# Patient Record
Sex: FEMALE | Race: WHITE | Employment: FULL TIME | URBAN - METROPOLITAN AREA
[De-identification: names, ages, dates, MRNs, and addresses within clinical notes are randomized per-mention and may not be internally consistent; named-entity substitution may affect disease eponyms.]

---

## 2017-01-17 ENCOUNTER — TELEPHONE (OUTPATIENT)
Dept: FAMILY MEDICINE CLINIC | Age: 47
End: 2017-01-17

## 2017-01-17 NOTE — TELEPHONE ENCOUNTER
----- Message from Brittany Anne sent at 1/17/2017 10:33 AM EST -----  Regarding: Dr. Nimo oJnes  Pt called regarding status of test results requested a wk ago. Pt would like a call back today. Best contact number [phone]) until 5:00 p.m.

## 2017-01-18 NOTE — TELEPHONE ENCOUNTER
LOV: 08/11/2015  NOV: none  LAB: 12-  TSH: 4.480        Spoke with patient, ID verified X 2. Informed patient of lab results and recommendations per Dr. Valery Evans. Patient verbalized understanding.

## 2017-01-19 RX ORDER — LEVOTHYROXINE SODIUM 100 UG/1
100 TABLET ORAL
Qty: 90 TAB | Refills: 0 | Status: SHIPPED | OUTPATIENT
Start: 2017-01-19 | End: 2017-04-18 | Stop reason: SDUPTHER

## 2017-08-11 RX ORDER — ESOMEPRAZOLE MAGNESIUM 40 MG/1
CAPSULE, DELAYED RELEASE ORAL
Qty: 60 CAP | Refills: 0 | Status: SHIPPED | OUTPATIENT
Start: 2017-08-11 | End: 2017-11-26 | Stop reason: SDUPTHER

## 2017-10-16 ENCOUNTER — HOSPITAL ENCOUNTER (OUTPATIENT)
Dept: LAB | Age: 47
Discharge: HOME | End: 2017-10-16
Payer: COMMERCIAL

## 2017-10-16 ENCOUNTER — OFFICE VISIT (OUTPATIENT)
Dept: FAMILY MEDICINE CLINIC | Age: 47
End: 2017-10-16

## 2017-10-16 VITALS
BODY MASS INDEX: 31.59 KG/M2 | OXYGEN SATURATION: 100 % | SYSTOLIC BLOOD PRESSURE: 122 MMHG | DIASTOLIC BLOOD PRESSURE: 61 MMHG | RESPIRATION RATE: 16 BRPM | HEART RATE: 66 BPM | TEMPERATURE: 97.5 F | WEIGHT: 189.6 LBS | HEIGHT: 65 IN

## 2017-10-16 DIAGNOSIS — H54.8 LEGAL BLINDNESS: ICD-10-CM

## 2017-10-16 DIAGNOSIS — Z01.419 WELL WOMAN EXAM WITH ROUTINE GYNECOLOGICAL EXAM: Primary | ICD-10-CM

## 2017-10-16 DIAGNOSIS — R07.89 OTHER CHEST PAIN: ICD-10-CM

## 2017-10-16 DIAGNOSIS — R10.13 EPIGASTRIC ABDOMINAL PAIN: ICD-10-CM

## 2017-10-16 DIAGNOSIS — R12 HEARTBURN: ICD-10-CM

## 2017-10-16 DIAGNOSIS — E78.00 PURE HYPERCHOLESTEROLEMIA: ICD-10-CM

## 2017-10-16 DIAGNOSIS — E55.9 VITAMIN D DEFICIENCY: ICD-10-CM

## 2017-10-16 DIAGNOSIS — E06.3 HYPOTHYROIDISM DUE TO HASHIMOTO'S THYROIDITIS: ICD-10-CM

## 2017-10-16 DIAGNOSIS — N93.8 DUB (DYSFUNCTIONAL UTERINE BLEEDING): ICD-10-CM

## 2017-10-16 DIAGNOSIS — Z12.39 BREAST CANCER SCREENING: ICD-10-CM

## 2017-10-16 DIAGNOSIS — E03.8 HYPOTHYROIDISM DUE TO HASHIMOTO'S THYROIDITIS: ICD-10-CM

## 2017-10-16 DIAGNOSIS — R10.31 RLQ ABDOMINAL PAIN: ICD-10-CM
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
## 2017-10-16 DIAGNOSIS — R19.5 LOOSE STOOLS: ICD-10-CM

## 2017-10-16 PROCEDURE — 88175 CYTOPATH C/V AUTO FLUID REDO: CPT | Performed by: FAMILY MEDICINE

## 2017-10-16 NOTE — PROGRESS NOTES
HISTORY OF PRESENT ILLNESS  Bisi Ling is a 49 y.o. female here for an annual physical exam    Agree with nurse note. I last saw Bisi on 08/11/15 for a physical.      Legally blind pt with Hashimoto's thyroiditis, hypercholesterolemia, and Vit D deficiency presents to the office with a BP of 122/61. She mentions she has had elevated BP readings when wanting to give blood. For hypothyroidism, she takes Synthroid 100 mcg daily, tolerating well. She is fasting for labs today. She still has irregular menses and notes menses come q3 weeks. \"I have always been irregular. \" Her most recent pap smear was on 05/24/13 performed by me; normal.     Pt's most recent mammogram was on 03/05/15; normal.     She sees dermatologist, Dr. Nereida Damico annually. New Concerns    She mentions waking up periodically in the middle of the night \"burping bile. \" She also gets epigastric abdominal pain off and on. She takes Nexium every other day and takes Tums prn. She is unsure if these episodes occur on the days she skips Nexium. She gets RLQ abdominal pain off and on. She describes it as \"sharp pains. \" Lasting x1 day but not constant. She does have gas with the pain. She also has had loose stools off and on about 3 episodes weekly. She notes improvement since decreasing intake of [employer] frappucinos from daily to once weekly. She mentions falling on the ground while walking in early 08/2017. A couple witnessed and told her that her walking stick got caught under a moving truck. Denies LOC or head trauma. Written by olvin Ramírez, as dictated by Dr. Guerda Durham DO.    ROS    Review of Systems is negative except as mentioned above in HPI.     ALLERGIES:    Allergies   Allergen Reactions    Zantac [Ranitidine Hcl] Other (comments)     Can't focus       CURRENT MEDICATIONS:    Outpatient Prescriptions Marked as Taking for the 10/16/17 encounter (Office Visit) with Kuldip Cid DO Medication Sig Dispense Refill    esomeprazole (NEXIUM) 40 mg capsule TAKE 1 CAPSULE DAILY (OVERDUE FOR OFFICE VISIT. 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  Chickenpox childhood    Fracture of elbow 1976    Left        due to fall    Heartburn 2000    Dr. Sergio Rose    History of gallstones 07/2007    Dr. Alex Manzo Hypothyroidism 01/2006    Hashimoto's.  Pure hypercholesterolemia 10/29/07   [facility]    Dr. Annette Madison in [place] Vitamin D deficiency 05/2012       PAST SURGICAL HISTORY:    Past Surgical History:   Procedure Laterality Date    [address]    to correct scoliosis. Dr. Annette Madison in [address] [place] [address] HISTORY:    Family History   Problem Relation Age of Onset    Adopted: Yes    Cancer Mother      skin    Thyroid Disease Mother     Hypertension Mother    [facility] [place] Arthritis-osteo Father        SOCIAL HISTORY:    Social History     Social History    Marital status:      Spouse name: N/A    Number of children: N/A    Years of education: N/A     Social History Main Topics    Smoking status: Never Smoker    Smokeless tobacco: Never Used    Alcohol use 0.0 oz/week      Comment: yearly    Drug use: No    Sexual activity: Yes     Partners: Male     Birth control/ protection: None     Other Topics Concern    None     Social History Narrative       IMMUNIZATIONS:    There is no immunization history on file for this patient.       PHYSICAL EXAMINATION    Vital signs     Visit Vitals    /61 (BP 1 Location: Left arm, BP Patient Position: Sitting)    Pulse 66    Temp 97.5 °F (36.4 °C) (Oral)    Resp 16   5' 5\" (1.651 m)    Wt 189 lb 9.6 oz (86 kg)    LMP 09/14/2017 (Approximate)    SpO2 100%    BMI 31.55 kg/m2        General appearance - Well nourished. Well appearing. Well developed. No acute distress. Obese. Head - Normocephalic. Atraumatic. Non tender sinuses x 4. Eyes - pupils equal and reactive, extraocular eye movements intact, sclera anicteric. Mildly injected sclera. Legally blind. Ears - Hearing is grossly normal bilaterally. Bilateral TM's intact. External ear canals normal without evidence of blood or swelling. Nose - normal and patent. No erythema or turbinate edema. No discharge. No polyps. Mouth - mucous membranes with adequate moisture. Posterior pharynx normal without lesions, white exudate, or obstruction. Neck - supple. Midline trachea. No carotid bruits are noted. No thyromegaly noted. Neck is supple without rigidity. Chest - clear to auscultation bilaterally anterriorly and posteriorly. No wheezes, rales or rhonchi. Breath sounds are symmetrical bilaterally. Unlabored respirations. Heart - normal rate. Regular rhythm. Normal S1, S2. No murmurs. No rubs, clicks or gallops noted. Abdomen - soft and distended. No masses or organomegaly. No rebound, rigidity or guarding. Bowel sounds normal x 4 quadrants. No tenderness noted. Negative Forman and McBurney signs. Back exam - normal range of motion. No pain on palpation of the spinous processes in the cervical, thoracic, lumbar, sacral regions. No CVA tenderness. Neurological - awake, alert and oriented to person, place, and time and event. Cranial nerves II through XII intact. No focal findings. Clear speech. Muscle strength is +5/5 x 4 extremities. Sensation is intact to light touch bilaterally. Steady gait. Ambulates with walking stick. Musculoskeletal - Intact x 4 extremities. Full ROM x 4 extremities. No pain with movement.   No pain on palpation of the bilateral shoulders, elbows, wrists, hands. No tenderness in the pelvis, pubic bone, bilateral hips, knees, ankles. No obvious deformity. Heme/Lymph - peripheral pulses normal x 4 extremities. No peripheral edema is noted. No cervical adenopathy noted. Skin - no rashes, erythema, ecchymosis, lacerations, abrasions, suspicious moles  Psychological -   normal behavior, dress and thought processes. Good insight. Good eye contact. Normal affect. Appropriate mood. Normal speech. GYN EXAM:  Vagina and vulva are normal.  Thin, gray watery vaginal discharge at inferior aspect and thicker, white, chalky, scant discharge present at superior aspect noted. Cervix normal with few nabothian cysts. Uterus is normal without tenderness. Adnexa normal in size without masses or tenderness. Pap Smear - is completed today. BREASTS:  Breasts are symmetric with fibrocystic changes. No dominant, discrete, fixed  or suspicious masses are noted. No skin changes or axillary nodes. No nipple inversion or discharge. DATA REVIEWED    Lab Results   Component Value Date/Time    WBC 6.8 02/25/2014 01:11 PM    HGB 12.6 02/25/2014 01:11 PM    HCT 38.1 02/25/2014 01:11 PM    PLATELET 987 25/45/7504 01:11 PM    MCV 94 02/25/2014 01:11 PM     Lab Results   Component Value Date/Time    Sodium 138 12/06/2016 08:43 AM    Potassium 4.5 12/06/2016 08:43 AM    Chloride 104 12/06/2016 08:43 AM    CO2 22 12/06/2016 08:43 AM    Anion gap 10 09/22/2009 10:05 AM    Glucose 89 12/06/2016 08:43 AM    BUN 10 12/06/2016 08:43 AM    Creatinine 0.89 12/06/2016 08:43 AM    BUN/Creatinine ratio 11 12/06/2016 08:43 AM    GFR est AA 89 12/06/2016 08:43 AM    GFR est non-AA 77 12/06/2016 08:43 AM    Calcium 9.8 12/06/2016 08:43 AM    Bilirubin, total 0.5 12/06/2016 08:43 AM    AST (SGOT) 17 12/06/2016 08:43 AM    Alk.  phosphatase 71 12/06/2016 08:43 AM    Protein, total 7.3 12/06/2016 08:43 AM    Albumin 4.6 12/06/2016 08:43 AM    Globulin 3.4 09/22/2009 10:05 AM    MONTRELL Ratio 1.7 12/06/2016 08:43 AM    ALT (SGPT) 11 12/06/2016 08:43 AM     Lab Results   Component Value Date/Time    Cholesterol, total 249 12/06/2016 08:43 AM    HDL Cholesterol 40 12/06/2016 08:43 AM    LDL, calculated 176 12/06/2016 08:43 AM    VLDL, calculated 33 12/06/2016 08:43 AM    Triglyceride 166 12/06/2016 08:43 AM    CHOL/HDL Ratio 5.2 09/22/2009 10:05 AM     Lab Results   Component Value Date/Time    Vitamin D 25-Hydroxy 31 09/22/2009 10:05 AM    VITAMIN D, 25-HYDROXY 40.9 12/06/2016 08:43 AM       Lab Results   Component Value Date/Time    Hemoglobin A1c 5.3 02/25/2014 01:11 PM     Lab Results   Component Value Date/Time    TSH 4.480 12/06/2016 08:43 AM       ASSESSMENT and PLAN    ICD-10-CM ICD-9-CM    1. Well woman exam with routine gynecological exam Z01.419 V72.31 PAP IG, RFX HPV ASCU, 65&42,42([id number])      AMB POC EKG ROUTINE W/ 12 LEADS, INTER & REP      LIPID PANEL      METABOLIC PANEL, COMPREHENSIVE      TSH 3RD GENERATION      VITAMIN D, 25 HYDROXY      URINALYSIS W/ RFLX MICROSCOPIC      HEMOGLOBIN A1C WITH EAG      T4, FREE      CBC W/O DIFF   2. Other chest pain R07.89 786.59 AMB POC EKG ROUTINE W/ 12 LEADS, INTER & REP      METABOLIC PANEL, COMPREHENSIVE      TSH 3RD GENERATION      CBC W/O DIFF      H PYLORI AB, IGG, QT    due to heartburn vs other   3. Hypothyroidism due to Hashimoto's thyroiditis E03.8 244.8 TSH 3RD GENERATION    E06.3 245.2 T4, FREE   4. Pure hypercholesterolemia E78.00 272.0 LIPID PANEL      METABOLIC PANEL, COMPREHENSIVE   5. RLQ abdominal pain U88.24 550.14 METABOLIC PANEL, COMPREHENSIVE      CBC W/O DIFF    intermittently 1-2 times monthly, due to gas pains vs ?ovulation vs other   6. Breast cancer screening Z12.31 V76.10 JULIA MAMMO BI DX INCL CAD   7. Heartburn R12 787.1 H PYLORI AB, IGG, QT   8. Epigastric abdominal pain R10.13 789.06    9. Legal blindness H54.8 369.4     Bilaterally   10. Vitamin D deficiency E55.9 268.9 VITAMIN D, 25 HYDROXY   11.  Loose stools R19.5 787.7 CELIAC ANTIBODY PROFILE    off and on x 3 episodes weekly due to drinking Frappacinos/lactose intolerance, improving since reduced Frappacino intake   12. Fall from ground level W18.30XA E888.9     due to walking into a parked moving truck 08/05/17   13. DUB (dysfunctional uterine bleeding) N93.8 626.8 CBC W/O DIFF    q 3 weeks     See ophthalmologist every 2 to 3 years unless otherwise directed. See dentist every 6 months. See dermatologist for annual check. Pt was advised that new acute care problems during a CPE could generate a second bill with their insurance. Chart reviewed and updated. Continue current medications. Mammogram ordered. Get ov notes from Dr. Brian Sims. Advised pt to sign medical release form at check out today. Referrals given. Keep appointments with specialists. Discussed recent test results and goals with patient. Recheck pertinent labs today while fasting. Recent PAP and mammograms reviewed. Recheck as instructed by specialists. Immunizations are noted. Advised balanced diet and exercise. Proper rest.  Increase water and fiber. Avoid tobacco.  Decrease alcohol and caffeine. Decrease carbohydrates, increase green leafy vegetables and protein. Increase water intake. Eat 3-5 small meals daily. Increase physical activity. Relevant handouts provided and discussed with pt. Counselled pt on:  Patient health concerns. Abdominal pain, heartburn, and weight management. Discussed ACP with pt today. Declines honoring choices referral.   Discussed the patient's BMI with her. The BMI follow up plan is as follows: I have counseled this patient on diet and exercise regimens. Follow-up Disposition:  Return in about 1 year (around 10/16/2018) for cpe, refills. Patient was offered a choice/choices in the treatment plan today. Patient expresses understanding of the plan and agrees with recommendations. More than 40 minutes spent face to face with patient. More than 50% of this time was spent in counseling and coordination of care today. Written by olvin June, as dictated by Dr. Rick Mac DO. Documentation True and Accepted by Marjorie Yu. Lucita Shields. Patient Instructions        Well Visit, Ages 25 to 48: Care Instructions  Your Care Instructions  Physical exams can help you stay healthy. Your doctor has checked your overall health and may have suggested ways to take good care of yourself. He or she also may have recommended tests. At home, you can help prevent illness with healthy eating, regular exercise, and other steps. Follow-up care is a key part of your treatment and safety. Be sure to make and go to all appointments, and call your doctor if you are having problems. It's also a good idea to know your test results and keep a list of the medicines you take. How can you care for yourself at home? · Reach and stay at a healthy weight. This will lower your risk for many problems, such as obesity, diabetes, heart disease, and high blood pressure. · Get at least 30 minutes of physical activity on most days of the week. Walking is a good choice. You also may want to do other activities, such as running, swimming, cycling, or playing tennis or team sports. Discuss any changes in your exercise program with your doctor. · Do not smoke or allow others to smoke around you. If you need help quitting, talk to your doctor about stop-smoking programs and medicines. These can increase your chances of quitting for good. · Talk to your doctor about whether you have any risk factors for sexually transmitted infections (STIs). Having one sex partner (who does not have STIs and does not have sex with anyone else) is a good way to avoid these infections. · Use birth control if you do not want to have children at this time. Talk with your doctor about the choices available and what might be best for you. · Protect your skin from too much sun. When you're outdoors from 10 a.m. to 4 p.m., stay in the shade or cover up with clothing and a hat with a wide brim. Wear sunglasses that block UV rays. Even when it's cloudy, put broad-spectrum sunscreen (SPF 30 or higher) on any exposed skin. · See a dentist one or two times a year for checkups and to have your teeth cleaned. · Wear a seat belt in the car. · Drink alcohol in moderation, if at all. That means no more than 2 drinks a day for men and 1 drink a day for women. Follow your doctor's advice about when to have certain tests. These tests can spot problems early. For everyone  · Cholesterol. Have the fat (cholesterol) in your blood tested after age 21. Your doctor will tell you how often to have this done based on your age, family history, or other things that can increase your risk for heart disease. · Blood pressure. Have your blood pressure checked during a routine doctor visit. Your doctor will tell you how often to check your blood pressure based on your age, your blood pressure results, and other factors. · Vision. Talk with your doctor about how often to have a glaucoma test.  · Diabetes. Ask your doctor whether you should have tests for diabetes. · Colon cancer. Have a test for colon cancer at age 48. You may have one of several tests. If you are younger than 48, you may need a test earlier if you have any risk factors. Risk factors include whether you already had a precancerous polyp removed from your colon or whether your parent, brother, sister, or child has had colon cancer. For women  · Breast exam and mammogram. Talk to your doctor about when you should have a clinical breast exam and a mammogram. Medical experts differ on whether and how often women under 50 should have these tests. Your doctor can help you decide what is right for you. · Pap test and pelvic exam. Begin Pap tests at age 24. A Pap test is the best way to find cervical cancer.  The test often is part of a pelvic exam. Ask how often to have this test.  · Tests for sexually transmitted infections (STIs). Ask whether you should have tests for STIs. You may be at risk if you have sex with more than one person, especially if your partners do not wear condoms. For men  · Tests for sexually transmitted infections (STIs). Ask whether you should have tests for STIs. You may be at risk if you have sex with more than one person, especially if you do not wear a condom. · Testicular cancer exam. Ask your doctor whether you should check your testicles regularly. · Prostate exam. Talk to your doctor about whether you should have a blood test (called a PSA test) for prostate cancer. Experts differ on whether and when men should have this test. Some experts suggest it if you are older than 39 and are -American or have a father or brother who got prostate cancer when he was younger than 72. When should you call for help? Watch closely for changes in your health, and be sure to contact your doctor if you have any problems or symptoms that concern you. Where can you learn more? Go to http://lory-gail.info/. Enter P072 in the search box to learn more about \"Well Visit, Ages 25 to 48: Care Instructions. \"  Current as of: July 19, 2016  Content Version: 11.3  © 8779-0713 [employer]. Care instructions adapted under license by [employer] (which disclaims liability or warranty for this information). If you have questions about a medical condition or this instruction, always ask your healthcare professional. [place] [address] any warranty or liability for your use of this information. Learning About Obesity  What is obesity? Obesity means having so much body fat that your health is in danger. Having too much body fat can lead to type 2 diabetes, heart disease, high blood pressure, arthritis, sleep apnea, and stroke.   Even if you don't feel bad now, think about these health risks. Do they seem like a good reason to start on a new path toward a healthier weight? Or do you have another personal, powerful reason for wanting to lose weight? Whatever it is, keep it in mind. It can be hard to change eating habits and exercise habits. But with your own reason and plan, you can do it. How do you know if your weight is in the obesity range? To know if your weight is in the obesity range, your doctor looks at your body mass index (BMI) and waist size. Your BMI is a number that is calculated from your weight and your height. To figure your BMI for yourself, get a BMI table from your doctor or use an online tool, such as http://www.GroundLink.com/ on the [employer] [place]. What causes obesity? When you take in more calories than you burn off, you gain weight. How you eat, how active you are, and other things affect how your body uses calories and whether you gain weight. If you have family members who have too much body fat, you may have inherited a tendency to gain weight. And your family also helps form your eating and lifestyle habits, which can lead to obesity. Also, our busy lives make it harder to plan and cook healthy meals. For many of us, it's easier to reach for prepared foods, go out to eat, or go to the drive-through. But these foods are often high in saturated fat and calories. Portions are often too large. What can you do to reach a healthy weight? Focus on health, not diets. Diets are hard to stay on and don't work in the long run. It is very hard to stay with a diet that includes lots of big changes in your eating habits. Instead of a diet, focus on lifestyle changes that will improve your health and achieve the right balance of energy and calories. To lose weight, you need to burn more calories than you take in.  You can do it by eating healthy foods in reasonable amounts and becoming more active, even a little bit every day. Making small changes over time can add up to a lot. Make a plan for change. Many people have found that naming their reasons for change and staying focused on their plan can make a big difference. Work with your doctor to create a plan that is right for you. · Ask yourself: Donnalee Rubinstein are my personal, most powerful reasons for wanting this change? What will my life look like when I've made the change? \"  · Set your long-term goal. Make it specific, such as \"I will lose x pounds. \"  · Break your long-term goal into smaller, short-term goals. Make these small steps specific and within your reach, things you know you can do. These steps are what keep you going from day to day. How can you stay on your plan for change? Be ready. Choose to start during a time when there are few events that might trigger slip-ups, like holidays, social events, and high-stress periods. Decide on your first few steps. Most people have more success when they make small changes, one step at a time. For example, you might switch a daily candy bar to a piece of fruit, walk 10 minutes more, or add more vegetables to a meal.  Line up your support people. Make sure you're not going to be alone as you make this change. Connect with people who understand how important it is to you. Ask family members and friends for help in keeping with your plan. And think about who could make it harder for you, and how to handle them. Try tracking. People who keep track of what they eat, feel, and do are better at losing weight. Try writing down things like:  · What and how much you eat. · How you feel before and after each meal.  · Details about each meal (like eating out or at home, eating alone, or with friends or family). · What you do to be active. Look and plan. As you track, look for patterns that you may want to change. Take note of:  · When you eat and whether you skip meals. · How often you eat out.   · How many fruits and vegetables you eat. · When you eat beyond feeling full. · When and why you eat for reasons other than being hungry. When you stray from your plan, don't get upset. Figure out what made you slip up and how you can fix it. Can you take medicines or have surgery to lose weight? Before your doctor will prescribe medicines or surgery, he or she will probably want you to be more active and follow your healthy eating plan for a period of time. These habits are key lifelong changes for managing your weight, with or without other medical treatment. And these changes can help you avoid weight-related health problems. Follow-up care is a key part of your treatment and safety. Be sure to make and go to all appointments, and call your doctor if you are having problems. It's also a good idea to know your test results and keep a list of the medicines you take. Where can you learn more? Go to http://loryVendgail.info/. Enter N111 in the search box to learn more about \"Learning About Obesity. \"  Current as of: October 13, 2016  Content Version: 11.3  © 3773-9257 [employer]. Care instructions adapted under license by [employer] (which disclaims liability or warranty for this information). If you have questions about a medical condition or this instruction, always ask your healthcare professional. [address] any warranty or liability for your use of this information. Starting a Weight Loss Plan: Care Instructions  Your Care Instructions  If you are thinking about losing weight, it can be hard to know where to start. Your doctor can help you set up a weight loss plan that best meets your needs. You may want to take a class on nutrition or exercise, or join a weight loss support group.  If you have questions about how to make changes to your eating or exercise habits, ask your doctor about seeing a registered dietitian or an exercise specialist.  It can be a big challenge to lose weight. But you do not have to make huge changes at once. Make small changes, and stick with them. When those changes become habit, add a few more changes. If you do not think you are ready to make changes right now, try to pick a date in the future. Make an appointment to see your doctor to discuss whether the time is right for you to start a plan. Follow-up care is a key part of your treatment and safety. Be sure to make and go to all appointments, and call your doctor if you are having problems. Its also a good idea to know your test results and keep a list of the medicines you take. How can you care for yourself at home? · Set realistic goals. Many people expect to lose much more weight than is likely. A weight loss of 5% to 10% of your body weight may be enough to improve your health. · Get family and friends involved to provide support. Talk to them about why you are trying to lose weight, and ask them to help. They can help by participating in exercise and having meals with you, even if they may be eating something different. · Find what works best for you. If you do not have time or do not like to cook, a program that offers meal replacement bars or shakes may be better for you. Or if you like to prepare meals, finding a plan that includes daily menus and recipes may be best.  · Ask your doctor about other health professionals who can help you achieve your weight loss goals. ¨ A dietitian can help you make healthy changes in your diet. ¨ An exercise specialist or  can help you develop a safe and effective exercise program.  ¨ A counselor or psychiatrist can help you cope with issues such as depression, anxiety, or family problems that can make it hard to focus on weight loss. · Consider joining a support group for people who are trying to lose weight. Your doctor can suggest groups in your area. Where can you learn more?   Go to http://lory-gail.info/. Enter Z597 in the search box to learn more about \"Starting a Weight Loss Plan: Care Instructions. \"  Current as of: October 13, 2016  Content Version: 11.3  © 0663-5106 [employer], [employer]. Care instructions adapted under license by redealize (which disclaims liability or warranty for this information). If you have questions about a medical condition or this instruction, always ask your healthcare professional. [address] any warranty or liability for your use of this information.

## 2017-10-16 NOTE — ACP (ADVANCE CARE PLANNING)
Discussed ACP with patient. Gave pt an Right [facility]. Patient prefers to read it on her own. Declines referral to [facility] team at this time. Patient will bring document to her next office visit or attach it to her MyChart record.

## 2017-10-16 NOTE — PROGRESS NOTES
Chief Complaint   Patient presents with    Complete Physical     with pap smear     1. Have you been to the ER, urgent care clinic since your last visit? Hospitalized since your last visit? No    2. Have you seen or consulted any other health care providers outside of the [address] [place] [address] since your last visit? Include any pap smears or colon screening. Pt has seen Dr. Arley Palacio, dermatology, since lov. In the event something were to happen to you and you were unable to speak on your behalf, do you have an Advance Directive/ Living Will in place stating your wishes? NO    If yes, do we have a copy on file NO    If no, would you like information:   Pt offered and declined. Pt is fasting today.

## 2017-10-16 NOTE — MR AVS SNAPSHOT
Visit Information Date & Time Provider Department Dept. Phone Encounter #  
 10/16/2017 11:00 AM Kashif Meza DO [facility] [phone] [id number] Follow-up Instructions Return in about 1 year (around 10/16/2018) for cpe, refills. Upcoming Health Maintenance Date Due DTaP/Tdap/Td series (1 - Tdap) 10/29/2021* PAP AKA CERVICAL CYTOLOGY 10/16/2020 *Topic was postponed. The date shown is not the original due date. Allergies as of 10/16/2017  Review Complete On: 10/16/2017 By: Miguel Christine Severity Noted Reaction Type Reactions Zantac [Ranitidine Hcl]  11/20/2007    Other (comments) Can't focus Current Immunizations  Reviewed on 10/16/2017 No immunizations on file. Reviewed by Kashif Meza DO on 10/16/2017 at 12:30 PM  
You Were Diagnosed With   
  
 Codes Comments Well woman exam with routine gynecological exam    -  Primary ICD-10-CM: G98.138 ICD-9-CM: V72.31 Other chest pain     ICD-10-CM: R07.89 ICD-9-CM: 786.59 due to heartburn vs other Hypothyroidism due to Hashimoto's thyroiditis     ICD-10-CM: E03.8, E06.3 ICD-9-CM: 244.8, 245.2 Pure hypercholesterolemia     ICD-10-CM: E78.00 ICD-9-CM: 272.0   
 RLQ abdominal pain     ICD-10-CM: R10.31 ICD-9-CM: 789.03 intermittently 1-2 times monthly, due to gas pains vs ?ovulation vs other Breast cancer screening     ICD-10-CM: Z12.31 
ICD-9-CM: V76.10 Heartburn     ICD-10-CM: R12 
ICD-9-CM: 787.1 Epigastric abdominal pain     ICD-10-CM: R10.13 ICD-9-CM: 789.06 Legal blindness     ICD-10-CM: H54.8 ICD-9-CM: 369.4 Bilaterally Vitamin D deficiency     ICD-10-CM: E55.9 ICD-9-CM: 268.9 Loose stools     ICD-10-CM: R19.5 ICD-9-CM: 787.7 off and on x 3 episodes weekly due to drinking Frappacinos/lactose intolerance, improving since reduced Frappacino intake Fall from ground level     ICD-10-CM: E37.82JD ICD-9-CM: E888.9 due to walking into a parked moving truck 08/05/17 DUB (dysfunctional uterine bleeding)     ICD-10-CM: N93.8 ICD-9-CM: 626.8 q 3 weeks Vitals BP Pulse Temp Resp Height(growth percentile) Weight(growth percentile) 122/61 (BP 1 Location: Left arm, BP Patient Position: Sitting) 66 97.5 °F (36.4 °C) (Oral) 16 5' 5\" (1.651 m) 189 lb 9.6 oz (86 kg) LMP SpO2 BMI OB Status Smoking Status 09/14/2017 (Approximate) 100% 31.55 kg/m2 Unknown Never Smoker Vitals History BMI and BSA Data Body Mass Index Body Surface Area 31.55 kg/m 2 1.99 m 2 Preferred Pharmacy Pharmacy Name Phone [address] [facility] [phone] Your Updated Medication List  
  
   
This list is accurate as of: 10/16/17  1:04 PM.  Always use your most recent med list.  
  
  
  
  
 esomeprazole 40 mg capsule Commonly known as:  [address] 1 CAPSULE DAILY (OVERDUE FOR OFFICE VISIT. PLEASE CALL TO SCHEDULE APPOINTMENT)  
  
 levothyroxine 100 mcg tablet Commonly known as:  SYNTHROID  
TAKE 1 TABLET DAILY BEFORE BREAKFAST (OVERDUE FOR THYROID CHECK, PLEASE CALL OFFICE TO SCHEDULE) OTHER Vitamin D 3 drops/day Vitamin B 1-2 tabs/day We Performed the Following AMB POC EKG ROUTINE W/ 12 LEADS, INTER & REP [44319 CPT(R)] CBC W/O DIFF [59639 CPT(R)] CELIAC ANTIBODY PROFILE [[id number] Custom] H PYLORI AB, IGG, QT [id number] CPT(R)] HEMOGLOBIN A1C WITH EAG [11785 CPT(R)] LIPID PANEL [84231 CPT(R)] METABOLIC PANEL, COMPREHENSIVE [31130 CPT(R)] T4, FREE [id number] CPT(R)] TSH 3RD GENERATION [39749 CPT(R)] URINALYSIS W/ RFLX MICROSCOPIC [91316 CPT(R)] VITAMIN D, 25 HYDROXY [id number] CPT(R)] Follow-up Instructions Return in about 1 year (around 10/16/2018) for cpe, refills. To-Do List   
 10/16/2017 Pathology:  PAP IG, RFX HPV ASCU, 81&81,61([id number]) 10/27/2017 Imaging:  JULIA MAMMO BI DX INCL CAD Patient Instructions Well Visit, Ages 25 to 48: Care Instructions Your Care Instructions Physical exams can help you stay healthy. Your doctor has checked your overall health and may have suggested ways to take good care of yourself. He or she also may have recommended tests. At home, you can help prevent illness with healthy eating, regular exercise, and other steps. Follow-up care is a key part of your treatment and safety. Be sure to make and go to all appointments, and call your doctor if you are having problems. It's also a good idea to know your test results and keep a list of the medicines you take. How can you care for yourself at home? · Reach and stay at a healthy weight. This will lower your risk for many problems, such as obesity, diabetes, heart disease, and high blood pressure. · Get at least 30 minutes of physical activity on most days of the week. Walking is a good choice. You also may want to do other activities, such as running, swimming, cycling, or playing tennis or team sports. Discuss any changes in your exercise program with your doctor. · Do not smoke or allow others to smoke around you. If you need help quitting, talk to your doctor about stop-smoking programs and medicines. These can increase your chances of quitting for good. · Talk to your doctor about whether you have any risk factors for sexually transmitted infections (STIs). Having one sex partner (who does not have STIs and does not have sex with anyone else) is a good way to avoid these infections. · Use birth control if you do not want to have children at this time. Talk with your doctor about the choices available and what might be best for you. · Protect your skin from too much sun.  When you're outdoors from 10 a.m. to 4 p.m., stay in the shade or cover up with clothing and a hat with a wide brim. Wear sunglasses that block UV rays. Even when it's cloudy, put broad-spectrum sunscreen (SPF 30 or higher) on any exposed skin. · See a dentist one or two times a year for checkups and to have your teeth cleaned. · Wear a seat belt in the car. · Drink alcohol in moderation, if at all. That means no more than 2 drinks a day for men and 1 drink a day for women. Follow your doctor's advice about when to have certain tests. These tests can spot problems early. For everyone · Cholesterol. Have the fat (cholesterol) in your blood tested after age 21. Your doctor will tell you how often to have this done based on your age, family history, or other things that can increase your risk for heart disease. · Blood pressure. Have your blood pressure checked during a routine doctor visit. Your doctor will tell you how often to check your blood pressure based on your age, your blood pressure results, and other factors. · Vision. Talk with your doctor about how often to have a glaucoma test. 
· Diabetes. Ask your doctor whether you should have tests for diabetes. · Colon cancer. Have a test for colon cancer at age 48. You may have one of several tests. If you are younger than 48, you may need a test earlier if you have any risk factors. Risk factors include whether you already had a precancerous polyp removed from your colon or whether your parent, brother, sister, or child has had colon cancer. For women · Breast exam and mammogram. Talk to your doctor about when you should have a clinical breast exam and a mammogram. Medical experts differ on whether and how often women under 50 should have these tests. Your doctor can help you decide what is right for you. · Pap test and pelvic exam. Begin Pap tests at age 24. A Pap test is the best way to find cervical cancer.  The test often is part of a pelvic exam. Ask how often to have this test. 
 · Tests for sexually transmitted infections (STIs). Ask whether you should have tests for STIs. You may be at risk if you have sex with more than one person, especially if your partners do not wear condoms. For men · Tests for sexually transmitted infections (STIs). Ask whether you should have tests for STIs. You may be at risk if you have sex with more than one person, especially if you do not wear a condom. · Testicular cancer exam. Ask your doctor whether you should check your testicles regularly. · Prostate exam. Talk to your doctor about whether you should have a blood test (called a PSA test) for prostate cancer. Experts differ on whether and when men should have this test. Some experts suggest it if you are older than 39 and are -American or have a father or brother who got prostate cancer when he was younger than 72. When should you call for help? Watch closely for changes in your health, and be sure to contact your doctor if you have any problems or symptoms that concern you. Where can you learn more? Go to http://lory-gail.info/. Enter P072 in the search box to learn more about \"Well Visit, Ages 25 to 48: Care Instructions. \" Current as of: July 19, 2016 Content Version: 11.3 © 1034-0077 [employer], [employer]. Care instructions adapted under license by [employer] (which disclaims liability or warranty for this information). If you have questions about a medical condition or this instruction, always ask your healthcare professional. [place] [address] any warranty or liability for your use of this information. Learning About Obesity What is obesity? Obesity means having so much body fat that your health is in danger. Having too much body fat can lead to type 2 diabetes, heart disease, high blood pressure, arthritis, sleep apnea, and stroke. Even if you don't feel bad now, think about these health risks.  Do they seem like a good reason to start on a new path toward a healthier weight? Or do you have another personal, powerful reason for wanting to lose weight? Whatever it is, keep it in mind. It can be hard to change eating habits and exercise habits. But with your own reason and plan, you can do it. How do you know if your weight is in the obesity range? To know if your weight is in the obesity range, your doctor looks at your body mass index (BMI) and waist size. Your BMI is a number that is calculated from your weight and your height. To figure your BMI for yourself, get a BMI table from your doctor or use an online tool, such as http://www.MarcoPolo Learning.Tivoli Audio/ on the [employer] of [employer]. What causes obesity? When you take in more calories than you burn off, you gain weight. How you eat, how active you are, and other things affect how your body uses calories and whether you gain weight. If you have family members who have too much body fat, you may have inherited a tendency to gain weight. And your family also helps form your eating and lifestyle habits, which can lead to obesity. Also, our busy lives make it harder to plan and cook healthy meals. For many of us, it's easier to reach for prepared foods, go out to eat, or go to the drive-through. But these foods are often high in saturated fat and calories. Portions are often too large. What can you do to reach a healthy weight? Focus on health, not diets. Diets are hard to stay on and don't work in the long run. It is very hard to stay with a diet that includes lots of big changes in your eating habits. Instead of a diet, focus on lifestyle changes that will improve your health and achieve the right balance of energy and calories. To lose weight, you need to burn more calories than you take in.  You can do it by eating healthy foods in reasonable amounts and becoming more active, even a little bit every day. Making small changes over time can add up to a lot. Make a plan for change. Many people have found that naming their reasons for change and staying focused on their plan can make a big difference. Work with your doctor to create a plan that is right for you. · Ask yourself: [place] are my personal, most powerful reasons for wanting this change? What will my life look like when I've made the change? \" · Set your long-term goal. Make it specific, such as \"I will lose x pounds. \" 
· Break your long-term goal into smaller, short-term goals. Make these small steps specific and within your reach, things you know you can do. These steps are what keep you going from day to day. How can you stay on your plan for change? Be ready. Choose to start during a time when there are few events that might trigger slip-ups, like holidays, social events, and high-stress periods. Decide on your first few steps. Most people have more success when they make small changes, one step at a time. For example, you might switch a daily candy bar to a piece of fruit, walk 10 minutes more, or add more vegetables to a meal. 
Line up your support people. Make sure you're not going to be alone as you make this change. Connect with people who understand how important it is to you. Ask family members and friends for help in keeping with your plan. And think about who could make it harder for you, and how to handle them. Try tracking. People who keep track of what they eat, feel, and do are better at losing weight. Try writing down things like: · What and how much you eat. · How you feel before and after each meal. 
· Details about each meal (like eating out or at home, eating alone, or with friends or family). · What you do to be active. Look and plan. As you track, look for patterns that you may want to change. Take note of: · When you eat and whether you skip meals. · How often you eat out. · How many fruits and vegetables you eat. · When you eat beyond feeling full. · When and why you eat for reasons other than being hungry. When you stray from your plan, don't get upset. Figure out what made you slip up and how you can fix it. Can you take medicines or have surgery to lose weight? Before your doctor will prescribe medicines or surgery, he or she will probably want you to be more active and follow your healthy eating plan for a period of time. These habits are key lifelong changes for managing your weight, with or without other medical treatment. And these changes can help you avoid weight-related health problems. Follow-up care is a key part of your treatment and safety. Be sure to make and go to all appointments, and call your doctor if you are having problems. It's also a good idea to know your test results and keep a list of the medicines you take. Where can you learn more? Go to http://lory-gail.info/. Enter N111 in the search box to learn more about \"Learning About Obesity. \" Current as of: October 13, 2016 Content Version: 11.3 © 9823-9207 [employer]. Care instructions adapted under license by [employer]em[employer] (which disclaims liability or warranty for this information). If you have questions about a medical condition or this instruction, always ask your healthcare professional. [place] [address] any warranty or liability for your use of this information. Starting a Weight Loss Plan: Care Instructions Your Care Instructions If you are thinking about losing weight, it can be hard to know where to start. Your doctor can help you set up a weight loss plan that best meets your needs. You may want to take a class on nutrition or exercise, or join a weight loss support group.  If you have questions about how to make changes to your eating or exercise habits, ask your doctor about seeing a registered dietitian or an exercise specialist. 
It can be a big challenge to lose weight. But you do not have to make huge changes at once. Make small changes, and stick with them. When those changes become habit, add a few more changes. If you do not think you are ready to make changes right now, try to pick a date in the future. Make an appointment to see your doctor to discuss whether the time is right for you to start a plan. Follow-up care is a key part of your treatment and safety. Be sure to make and go to all appointments, and call your doctor if you are having problems. Its also a good idea to know your test results and keep a list of the medicines you take. How can you care for yourself at home? · Set realistic goals. Many people expect to lose much more weight than is likely. A weight loss of 5% to 10% of your body weight may be enough to improve your health. · Get family and friends involved to provide support. Talk to them about why you are trying to lose weight, and ask them to help. They can help by participating in exercise and having meals with you, even if they may be eating something different. · Find what works best for you. If you do not have time or do not like to cook, a program that offers meal replacement bars or shakes may be better for you. Or if you like to prepare meals, finding a plan that includes daily menus and recipes may be best. 
· Ask your doctor about other health professionals who can help you achieve your weight loss goals. ¨ A dietitian can help you make healthy changes in your diet. ¨ An exercise specialist or  can help you develop a safe and effective exercise program. 
¨ A counselor or psychiatrist can help you cope with issues such as depression, anxiety, or family problems that can make it hard to focus on weight loss. · Consider joining a support group for people who are trying to lose weight. Your doctor can suggest groups in your area. Where can you learn more? Go to http://lory-gail.info/. Enter Q298 in the search box to learn more about \"Starting a Weight Loss Plan: Care Instructions. \" Current as of: October 13, 2016 Content Version: 11.3 © 2213-2025 [employer]. Care instructions adapted under license by Synchronized (which disclaims liability or warranty for this information). If you have questions about a medical condition or this instruction, always ask your healthcare professional. [address] any warranty or liability for your use of this information. Introducing [facility]! Mary Vin introduces Outski patient portal. Now you can access parts of your medical record, email your doctor's office, and request medication refills online. 1. In your internet browser, go to [email or web address] 2. Click on the First Time User? Click Here link in the Sign In box. You will see the New Member Sign Up page. 3. Enter your Outski Access Code exactly as it appears below. You will not need to use this code after youve completed the sign-up process. If you do not sign up before the expiration date, you must request a new code. · Outski Access Code: [email or web address] Expires: 1/14/2018  1:04 PM 
 
4. Enter the last four digits of your Social Security Number (xxxx) and Date of Birth (mm/dd/yyyy) as indicated and click Submit. You will be taken to the next sign-up page. 5. Create a Outski ID. This will be your Outski login ID and cannot be changed, so think of one that is secure and easy to remember. 6. Create a Outski password. You can change your password at any time. 7. Enter your Password Reset Question and Answer. This can be used at a later time if you forget your password. 8. Enter your e-mail address. You will receive e-mail notification when new information is available in [address] 9. Click Sign Up. You can now view and download portions of your medical record. 10. Click the Download Summary menu link to download a portable copy of your medical information. If you have questions, please visit the Frequently Asked Questions section of the ACTIVE Network website. Remember, [employer] is NOT to be used for urgent needs. For medical emergencies, dial 911. Now available from your iPhone and Android! Please provide this summary of care documentation to your next provider. Your primary care clinician is listed as Stephanie Matthews. If you have any questions after today's visit, please call [phone].

## 2017-10-16 NOTE — PATIENT INSTRUCTIONS
Well Visit, Ages 25 to 48: Care Instructions  Your Care Instructions  Physical exams can help you stay healthy. Your doctor has checked your overall health and may have suggested ways to take good care of yourself. He or she also may have recommended tests. At home, you can help prevent illness with healthy eating, regular exercise, and other steps. Follow-up care is a key part of your treatment and safety. Be sure to make and go to all appointments, and call your doctor if you are having problems. It's also a good idea to know your test results and keep a list of the medicines you take. How can you care for yourself at home? · Reach and stay at a healthy weight. This will lower your risk for many problems, such as obesity, diabetes, heart disease, and high blood pressure. · Get at least 30 minutes of physical activity on most days of the week. Walking is a good choice. You also may want to do other activities, such as running, swimming, cycling, or playing tennis or team sports. Discuss any changes in your exercise program with your doctor. · Do not smoke or allow others to smoke around you. If you need help quitting, talk to your doctor about stop-smoking programs and medicines. These can increase your chances of quitting for good. · Talk to your doctor about whether you have any risk factors for sexually transmitted infections (STIs). Having one sex partner (who does not have STIs and does not have sex with anyone else) is a good way to avoid these infections. · Use birth control if you do not want to have children at this time. Talk with your doctor about the choices available and what might be best for you. · Protect your skin from too much sun. When you're outdoors from 10 a.m. to 4 p.m., stay in the shade or cover up with clothing and a hat with a wide brim. Wear sunglasses that block UV rays. Even when it's cloudy, put broad-spectrum sunscreen (SPF 30 or higher) on any exposed skin.   · See a dentist one or two times a year for checkups and to have your teeth cleaned. · Wear a seat belt in the car. · Drink alcohol in moderation, if at all. That means no more than 2 drinks a day for men and 1 drink a day for women. Follow your doctor's advice about when to have certain tests. These tests can spot problems early. For everyone  · Cholesterol. Have the fat (cholesterol) in your blood tested after age 21. Your doctor will tell you how often to have this done based on your age, family history, or other things that can increase your risk for heart disease. · Blood pressure. Have your blood pressure checked during a routine doctor visit. Your doctor will tell you how often to check your blood pressure based on your age, your blood pressure results, and other factors. · Vision. Talk with your doctor about how often to have a glaucoma test.  · Diabetes. Ask your doctor whether you should have tests for diabetes. · Colon cancer. Have a test for colon cancer at age 48. You may have one of several tests. If you are younger than 48, you may need a test earlier if you have any risk factors. Risk factors include whether you already had a precancerous polyp removed from your colon or whether your parent, brother, sister, or child has had colon cancer. For women  · Breast exam and mammogram. Talk to your doctor about when you should have a clinical breast exam and a mammogram. Medical experts differ on whether and how often women under 50 should have these tests. Your doctor can help you decide what is right for you. · Pap test and pelvic exam. Begin Pap tests at age 24. A Pap test is the best way to find cervical cancer. The test often is part of a pelvic exam. Ask how often to have this test.  · Tests for sexually transmitted infections (STIs). Ask whether you should have tests for STIs. You may be at risk if you have sex with more than one person, especially if your partners do not wear condoms.   For men  · Tests for sexually transmitted infections (STIs). Ask whether you should have tests for STIs. You may be at risk if you have sex with more than one person, especially if you do not wear a condom. · Testicular cancer exam. Ask your doctor whether you should check your testicles regularly. · Prostate exam. Talk to your doctor about whether you should have a blood test (called a PSA test) for prostate cancer. Experts differ on whether and when men should have this test. Some experts suggest it if you are older than 39 and are -American or have a father or brother who got prostate cancer when he was younger than 72. When should you call for help? Watch closely for changes in your health, and be sure to contact your doctor if you have any problems or symptoms that concern you. Where can you learn more? Go to http://lory-gail.info/. Enter P072 in the search box to learn more about \"Well Visit, Ages 25 to 48: Care Instructions. \"  Current as of: July 19, 2016  Content Version: 11.3  © 9563-7024 [employer]. Care instructions adapted under license by [employer] (which disclaims liability or warranty for this information). If you have questions about a medical condition or this instruction, always ask your healthcare professional. [address] any warranty or liability for your use of this information. Learning About Obesity  What is obesity? Obesity means having so much body fat that your health is in danger. Having too much body fat can lead to type 2 diabetes, heart disease, high blood pressure, arthritis, sleep apnea, and stroke. Even if you don't feel bad now, think about these health risks. Do they seem like a good reason to start on a new path toward a healthier weight? Or do you have another personal, powerful reason for wanting to lose weight? Whatever it is, keep it in mind.  It can be hard to change eating habits and exercise habits. But with your own reason and plan, you can do it. How do you know if your weight is in the obesity range? To know if your weight is in the obesity range, your doctor looks at your body mass index (BMI) and waist size. Your BMI is a number that is calculated from your weight and your height. To figure your BMI for yourself, get a BMI table from your doctor or use an online tool, such as http://www.cervantes.com/ on the [employer] of [employer]. What causes obesity? When you take in more calories than you burn off, you gain weight. How you eat, how active you are, and other things affect how your body uses calories and whether you gain weight. If you have family members who have too much body fat, you may have inherited a tendency to gain weight. And your family also helps form your eating and lifestyle habits, which can lead to obesity. Also, our busy lives make it harder to plan and cook healthy meals. For many of us, it's easier to reach for prepared foods, go out to eat, or go to the drive-through. But these foods are often high in saturated fat and calories. Portions are often too large. What can you do to reach a healthy weight? Focus on health, not diets. Diets are hard to stay on and don't work in the long run. It is very hard to stay with a diet that includes lots of big changes in your eating habits. Instead of a diet, focus on lifestyle changes that will improve your health and achieve the right balance of energy and calories. To lose weight, you need to burn more calories than you take in. You can do it by eating healthy foods in reasonable amounts and becoming more active, even a little bit every day. Making small changes over time can add up to a lot. Make a plan for change. Many people have found that naming their reasons for change and staying focused on their plan can make a big difference.  Work with your doctor to create a plan that is right for you. · Ask yourself: [employer] are my personal, most powerful reasons for wanting this change? What will my life look like when I've made the change? \"  · Set your long-term goal. Make it specific, such as \"I will lose x pounds. \"  · Break your long-term goal into smaller, short-term goals. Make these small steps specific and within your reach, things you know you can do. These steps are what keep you going from day to day. How can you stay on your plan for change? Be ready. Choose to start during a time when there are few events that might trigger slip-ups, like holidays, social events, and high-stress periods. Decide on your first few steps. Most people have more success when they make small changes, one step at a time. For example, you might switch a daily candy bar to a piece of fruit, walk 10 minutes more, or add more vegetables to a meal.  Line up your support people. Make sure you're not going to be alone as you make this change. Connect with people who understand how important it is to you. Ask family members and friends for help in keeping with your plan. And think about who could make it harder for you, and how to handle them. Try tracking. People who keep track of what they eat, feel, and do are better at losing weight. Try writing down things like:  · What and how much you eat. · How you feel before and after each meal.  · Details about each meal (like eating out or at home, eating alone, or with friends or family). · What you do to be active. Look and plan. As you track, look for patterns that you may want to change. Take note of:  · When you eat and whether you skip meals. · How often you eat out. · How many fruits and vegetables you eat. · When you eat beyond feeling full. · When and why you eat for reasons other than being hungry. When you stray from your plan, don't get upset. Figure out what made you slip up and how you can fix it.   Can you take medicines or have surgery to lose weight? Before your doctor will prescribe medicines or surgery, he or she will probably want you to be more active and follow your healthy eating plan for a period of time. These habits are key lifelong changes for managing your weight, with or without other medical treatment. And these changes can help you avoid weight-related health problems. Follow-up care is a key part of your treatment and safety. Be sure to make and go to all appointments, and call your doctor if you are having problems. It's also a good idea to know your test results and keep a list of the medicines you take. Where can you learn more? Go to http://lory-gail.info/. Enter N111 in the search box to learn more about \"Learning About Obesity. \"  Current as of: October 13, 2016  Content Version: 11.3  © 8861-8444 [employer]. Care instructions adapted under license by [employer] (which disclaims liability or warranty for this information). If you have questions about a medical condition or this instruction, always ask your healthcare professional. [address] any warranty or liability for your use of this information. Starting a Weight Loss Plan: Care Instructions  Your Care Instructions  If you are thinking about losing weight, it can be hard to know where to start. Your doctor can help you set up a weight loss plan that best meets your needs. You may want to take a class on nutrition or exercise, or join a weight loss support group. If you have questions about how to make changes to your eating or exercise habits, ask your doctor about seeing a registered dietitian or an exercise specialist.  It can be a big challenge to lose weight. But you do not have to make huge changes at once. Make small changes, and stick with them. When those changes become habit, add a few more changes.   If you do not think you are ready to make changes right now, try to pick a date in the future. Make an appointment to see your doctor to discuss whether the time is right for you to start a plan. Follow-up care is a key part of your treatment and safety. Be sure to make and go to all appointments, and call your doctor if you are having problems. Its also a good idea to know your test results and keep a list of the medicines you take. How can you care for yourself at home? · Set realistic goals. Many people expect to lose much more weight than is likely. A weight loss of 5% to 10% of your body weight may be enough to improve your health. · Get family and friends involved to provide support. Talk to them about why you are trying to lose weight, and ask them to help. They can help by participating in exercise and having meals with you, even if they may be eating something different. · Find what works best for you. If you do not have time or do not like to cook, a program that offers meal replacement bars or shakes may be better for you. Or if you like to prepare meals, finding a plan that includes daily menus and recipes may be best.  · Ask your doctor about other health professionals who can help you achieve your weight loss goals. ¨ A dietitian can help you make healthy changes in your diet. ¨ An exercise specialist or  can help you develop a safe and effective exercise program.  ¨ A counselor or psychiatrist can help you cope with issues such as depression, anxiety, or family problems that can make it hard to focus on weight loss. · Consider joining a support group for people who are trying to lose weight. Your doctor can suggest groups in your area. Where can you learn more? Go to http://lory-gail.info/. Enter O787 in the search box to learn more about \"Starting a Weight Loss Plan: Care Instructions. \"  Current as of: October 13, 2016  Content Version: 11.3  © 9161-9357 [employer].  Care instructions adapted under license by [address] (which disclaims liability or warranty for this information). If you have questions about a medical condition or this instruction, always ask your healthcare professional. [address] any warranty or liability for your use of this information.

## 2017-10-17 LAB
25(OH)D3+25(OH)D2 SERPL-MCNC: 43.1 NG/ML (ref 30–100)
ALBUMIN SERPL-MCNC: 4.1 G/DL (ref 3.5–5.5)
ALBUMIN/GLOB SERPL: 1.5 {RATIO} (ref 1.2–2.2)
ALP SERPL-CCNC: 68 IU/L (ref 39–117)
ALT SERPL-CCNC: 19 IU/L (ref 0–32)
APPEARANCE UR: CLEAR
AST SERPL-CCNC: 18 IU/L (ref 0–40)
BILIRUB SERPL-MCNC: 0.5 MG/DL (ref 0–1.2)
BILIRUB UR QL STRIP: NEGATIVE
BUN SERPL-MCNC: 8 MG/DL (ref 6–24)
BUN/CREAT SERPL: 11 (ref 9–23)
CALCIUM SERPL-MCNC: 9 MG/DL (ref 8.7–10.2)
CHLORIDE SERPL-SCNC: 100 MMOL/L (ref 96–106)
CHOLEST SERPL-MCNC: 202 MG/DL (ref 100–199)
CO2 SERPL-SCNC: 23 MMOL/L (ref 18–29)
COLOR UR: YELLOW
CREAT SERPL-MCNC: 0.76 MG/DL (ref 0.57–1)
ERYTHROCYTE [DISTWIDTH] IN BLOOD BY AUTOMATED COUNT: 13.6 % (ref 12.3–15.4)
EST. AVERAGE GLUCOSE BLD GHB EST-MCNC: 103 MG/DL
GLIADIN PEPTIDE IGA SER-ACNC: 4 UNITS (ref 0–19)
GLIADIN PEPTIDE IGG SER-ACNC: 2 UNITS (ref 0–19)
GLOBULIN SER CALC-MCNC: 2.8 G/DL (ref 1.5–4.5)
GLUCOSE SERPL-MCNC: 85 MG/DL (ref 65–99)
GLUCOSE UR QL: NEGATIVE
H PYLORI IGG SER IA-ACNC: <0.9 U/ML (ref 0–0.8)
HBA1C MFR BLD: 5.2 % (ref 4.8–5.6)
HCT VFR BLD AUTO: 37.6 % (ref 34–46.6)
HDLC SERPL-MCNC: 39 MG/DL
HGB BLD-MCNC: 13.2 G/DL (ref 11.1–15.9)
HGB UR QL STRIP: NEGATIVE
IGA SERPL-MCNC: 185 MG/DL (ref 87–352)
INTERPRETATION, 910389: NORMAL
KETONES UR QL STRIP: NEGATIVE
LDLC SERPL CALC-MCNC: 130 MG/DL (ref 0–99)
LEUKOCYTE ESTERASE UR QL STRIP: NEGATIVE
MCH RBC QN AUTO: 31.4 PG (ref 26.6–33)
MCHC RBC AUTO-ENTMCNC: 35.1 G/DL (ref 31.5–35.7)
MCV RBC AUTO: 90 FL (ref 79–97)
MICRO URNS: NORMAL
NITRITE UR QL STRIP: NEGATIVE
PH UR STRIP: 6 [PH] (ref 5–7.5)
PLATELET # BLD AUTO: 295 X10E3/UL (ref 150–379)
POTASSIUM SERPL-SCNC: 4.2 MMOL/L (ref 3.5–5.2)
PROT SERPL-MCNC: 6.9 G/DL (ref 6–8.5)
PROT UR QL STRIP: NEGATIVE
RBC # BLD AUTO: 4.2 X10E6/UL (ref 3.77–5.28)
SODIUM SERPL-SCNC: 138 MMOL/L (ref 134–144)
SP GR UR: 1.02 (ref 1–1.03)
T4 FREE SERPL-MCNC: 1.31 NG/DL (ref 0.82–1.77)
TRIGL SERPL-MCNC: 165 MG/DL (ref 0–149)
TSH SERPL DL<=0.005 MIU/L-ACNC: 5.43 UIU/ML (ref 0.45–4.5)
TTG IGA SER-ACNC: <2 U/ML (ref 0–3)
TTG IGG SER-ACNC: <2 U/ML (ref 0–5)
UROBILINOGEN UR STRIP-MCNC: 0.2 MG/DL (ref 0.2–1)
VLDLC SERPL CALC-MCNC: 33 MG/DL (ref 5–40)
WBC # BLD AUTO: 5.6 X10E3/UL (ref 3.4–10.8)

## 2017-11-02 ENCOUNTER — HOSPITAL ENCOUNTER (OUTPATIENT)
Dept: MAMMOGRAPHY | Age: 47
Discharge: HOME | End: 2017-11-02
Attending: FAMILY MEDICINE
Payer: COMMERCIAL

## 2017-11-02 DIAGNOSIS — Z12.39 BREAST CANCER SCREENING: ICD-10-CM

## 2017-11-02 PROCEDURE — 77067 SCR MAMMO BI INCL CAD: CPT

## 2017-11-28 NOTE — TELEPHONE ENCOUNTER
PCP: Araceli Greene DO    Last appt: 10/16/2017  No future appointments. Requested Prescriptions     Pending Prescriptions Disp Refills    esomeprazole (NEXIUM) 40 mg capsule [Pharmacy Med Name: ESOMEPRAZOLE MAG DR CAPS 40MG] 60 Cap 0     Sig: TAKE 1 CAPSULE DAILY (OVERDUE FOR OFFICE VISIT, PLEASE CALL TO SCHEDULE APPOINTMENT)       Request for a 30 or 90 day supply?  90    [facility]: [facility]

## 2017-11-29 RX ORDER — ESOMEPRAZOLE MAGNESIUM 40 MG/1
40 CAPSULE, DELAYED RELEASE ORAL DAILY
Qty: 90 CAP | Refills: 1 | Status: SHIPPED | OUTPATIENT
Start: 2017-11-29 | End: 2018-11-20 | Stop reason: SDUPTHER

## 2017-12-04 ENCOUNTER — TELEPHONE (OUTPATIENT)
Dept: FAMILY MEDICINE CLINIC | Age: 47
End: 2017-12-04

## 2017-12-04 DIAGNOSIS — N30.90 BLADDER INFECTION: Primary | ICD-10-CM

## 2017-12-04 DIAGNOSIS — E03.9 HYPOTHYROIDISM, UNSPECIFIED TYPE: ICD-10-CM

## 2017-12-04 RX ORDER — CEPHALEXIN 500 MG/1
500 CAPSULE ORAL 2 TIMES DAILY
Qty: 14 CAP | Refills: 0 | Status: SHIPPED | OUTPATIENT
Start: 2017-12-04 | End: 2017-12-11

## 2017-12-04 NOTE — TELEPHONE ENCOUNTER
All your lab results are normal except    Your cholesterol and triglyceride levels are too high. Advise patient to decrease fats, sweets, alcohol in your diet. Increase fiber in your diet. Decrease weight by 10 pounds, if you are overweight. Start over the counter Flax Seed, Coenzme Q10, and/or Niacin 500 mg twice a day. Take a baby Aspirin 30 minutes before the Niacin to decrease the possibility of \"flushing. \"  Exercise 5 days a week for 30 minutes a day as tolerated. Recheck cholesterol panel in 3-6 mos. Your thyroid lab results are abnormal.  Please contact our office for a lab only appointment to check additional thyroid panel. Your PAP test result is normal.  Recheck it in 3 years, per new PAP guidelines. Nurse will fax letter.

## 2017-12-04 NOTE — TELEPHONE ENCOUNTER
Writer called pt to notify of lab results. Writer spoke with pt, pt verified . Writer notified pt of lab results: All your lab results are normal except     Your cholesterol and triglyceride levels are too high. Advise patient to decrease fats, sweets, alcohol in your diet. Increase fiber in your diet. Decrease weight by 10 pounds, if you are overweight. Start over the counter Flax Seed, Coenzme Q10, and/or Niacin 500 mg twice a day. Take a baby Aspirin 30 minutes before the Niacin to decrease the possibility of \"flushing. \"  Exercise 5 days a week for 30 minutes a day as tolerated. Recheck cholesterol panel in 3-6 mos.     Your thyroid lab results are abnormal.  Please contact our office for a lab only appointment to check additional thyroid panel.     Your PAP test result is normal.  Recheck it in 3 years, per new PAP guidelines.     Pt verbalized understanding and appreciation. Writer told pt that a letter will also to be sent out to address on file. Pt verbalized appreciation.

## 2017-12-08 LAB
T4 FREE SERPL-MCNC: 1.6 NG/DL (ref 0.82–1.77)
TSH SERPL DL<=0.005 MIU/L-ACNC: 3.81 UIU/ML (ref 0.45–4.5)

## 2018-11-21 NOTE — TELEPHONE ENCOUNTER
PCP: Randell Herrera DO    Last appt: 12/7/2017  No future appointments.     Requested Prescriptions     Pending Prescriptions Disp Refills    esomeprazole (NEXIUM) 40 mg capsule [Pharmacy Med Name: ESOMEPRAZOLE MAG DR CAPS 40MG] 90 Cap 1     Sig: TAKE 1 CAPSULE DAILY       Prior labs and Blood pressures:  BP Readings from Last 3 Encounters:   10/16/17 122/61   08/11/15 126/87   06/03/14 122/81     Lab Results   Component Value Date/Time    Sodium 138 10/16/2017 01:49 PM    Potassium 4.2 10/16/2017 01:49 PM    Chloride 100 10/16/2017 01:49 PM    CO2 23 10/16/2017 01:49 PM    Anion gap 10 09/22/2009 10:05 AM    Glucose 85 10/16/2017 01:49 PM    BUN 8 10/16/2017 01:49 PM    Creatinine 0.76 10/16/2017 01:49 PM    BUN/Creatinine ratio 11 10/16/2017 01:49 PM    GFR est  10/16/2017 01:49 PM    GFR est non-AA 93 10/16/2017 01:49 PM    Calcium 9.0 10/16/2017 01:49 PM     Lab Results   Component Value Date/Time    Hemoglobin A1c 5.2 10/16/2017 01:49 PM     Lab Results   Component Value Date/Time    Cholesterol, total 202 (H) 10/16/2017 01:49 PM    HDL Cholesterol 39 (L) 10/16/2017 01:49 PM    LDL, calculated 130 (H) 10/16/2017 01:49 PM    VLDL, calculated 33 10/16/2017 01:49 PM    Triglyceride 165 (H) 10/16/2017 01:49 PM    CHOL/HDL Ratio 5.2 (H) 09/22/2009 10:05 AM     Lab Results   Component Value Date/Time    Vitamin D 25-Hydroxy 31 09/22/2009 10:05 AM    VITAMIN D, 25-HYDROXY 43.1 10/16/2017 01:49 PM       Lab Results   Component Value Date/Time    TSH 3.810 12/07/2017 02:50 PM

## 2018-11-26 RX ORDER — ESOMEPRAZOLE MAGNESIUM 40 MG/1
CAPSULE, DELAYED RELEASE ORAL
Qty: 30 CAP | Refills: 0 | Status: SHIPPED | OUTPATIENT
Start: 2018-11-26

## 2018-11-29 RX ORDER — ESOMEPRAZOLE MAGNESIUM 40 MG/1
CAPSULE, DELAYED RELEASE ORAL
Qty: 30 CAP | Refills: 0 | Status: CANCELLED | OUTPATIENT
Start: 2018-11-29

## 2018-11-29 NOTE — TELEPHONE ENCOUNTER
Requested Prescriptions     Pending Prescriptions Disp Refills    esomeprazole (NEXIUM) 40 mg capsule 30 Cap 0

## 2018-11-29 NOTE — TELEPHONE ENCOUNTER
PCP: Josy Hector DO    Last appt: 12/7/2017  No future appointments.     Requested Prescriptions     Pending Prescriptions Disp Refills    esomeprazole (NEXIUM) 40 mg capsule 30 Cap 0       Prior labs and Blood pressures:  BP Readings from Last 3 Encounters:   10/16/17 122/61   08/11/15 126/87   06/03/14 122/81     Lab Results   Component Value Date/Time    Sodium 138 10/16/2017 01:49 PM    Potassium 4.2 10/16/2017 01:49 PM    Chloride 100 10/16/2017 01:49 PM    CO2 23 10/16/2017 01:49 PM    Anion gap 10 09/22/2009 10:05 AM    Glucose 85 10/16/2017 01:49 PM    BUN 8 10/16/2017 01:49 PM    Creatinine 0.76 10/16/2017 01:49 PM    BUN/Creatinine ratio 11 10/16/2017 01:49 PM    GFR est  10/16/2017 01:49 PM    GFR est non-AA 93 10/16/2017 01:49 PM    Calcium 9.0 10/16/2017 01:49 PM     Lab Results   Component Value Date/Time    Hemoglobin A1c 5.2 10/16/2017 01:49 PM     Lab Results   Component Value Date/Time    Cholesterol, total 202 (H) 10/16/2017 01:49 PM    HDL Cholesterol 39 (L) 10/16/2017 01:49 PM    LDL, calculated 130 (H) 10/16/2017 01:49 PM    VLDL, calculated 33 10/16/2017 01:49 PM    Triglyceride 165 (H) 10/16/2017 01:49 PM    CHOL/HDL Ratio 5.2 (H) 09/22/2009 10:05 AM     Lab Results   Component Value Date/Time    Vitamin D 25-Hydroxy 31 09/22/2009 10:05 AM    VITAMIN D, 25-HYDROXY 43.1 10/16/2017 01:49 PM       Lab Results   Component Value Date/Time    TSH 3.810 12/07/2017 02:50 PM

## 2019-01-02 ENCOUNTER — HOSPITAL ENCOUNTER (EMERGENCY)
Age: 49
Discharge: HOME | End: 2019-01-02
Attending: STUDENT IN AN ORGANIZED HEALTH CARE EDUCATION/TRAINING PROGRAM
Payer: COMMERCIAL

## 2019-01-02 ENCOUNTER — APPOINTMENT (OUTPATIENT)
Dept: GENERAL RADIOLOGY | Age: 49
End: 2019-01-02
Attending: STUDENT IN AN ORGANIZED HEALTH CARE EDUCATION/TRAINING PROGRAM
Payer: COMMERCIAL

## 2019-01-02 VITALS
OXYGEN SATURATION: 100 % | HEIGHT: 65 IN | SYSTOLIC BLOOD PRESSURE: 142 MMHG | RESPIRATION RATE: 16 BRPM | DIASTOLIC BLOOD PRESSURE: 92 MMHG | BODY MASS INDEX: 31.55 KG/M2 | HEART RATE: 72 BPM | TEMPERATURE: 98.1 F

## 2019-01-02 DIAGNOSIS — R07.9 CHEST PAIN, UNSPECIFIED TYPE: Primary | ICD-10-CM

## 2019-01-02 LAB
ALBUMIN SERPL-MCNC: 3.9 G/DL (ref 3.5–5)
ALBUMIN/GLOB SERPL: 0.9 {RATIO} (ref 1.1–2.2)
ALP SERPL-CCNC: 114 U/L (ref 45–117)
ALT SERPL-CCNC: 35 U/L (ref 12–78)
ANION GAP SERPL CALC-SCNC: 7 MMOL/L (ref 5–15)
AST SERPL-CCNC: 26 U/L (ref 15–37)
ATRIAL RATE: 80 BPM
BASOPHILS # BLD: 0 K/UL (ref 0–0.1)
BASOPHILS NFR BLD: 1 % (ref 0–1)
BILIRUB SERPL-MCNC: 0.5 MG/DL (ref 0.2–1)
BUN SERPL-MCNC: 9 MG/DL (ref 6–20)
BUN/CREAT SERPL: 8 (ref 12–20)
CALCIUM SERPL-MCNC: 9.6 MG/DL (ref 8.5–10.1)
CALCULATED P AXIS, ECG09: 29 DEGREES
CALCULATED R AXIS, ECG10: -4 DEGREES
CALCULATED T AXIS, ECG11: 31 DEGREES
CHLORIDE SERPL-SCNC: 108 MMOL/L (ref 97–108)
CO2 SERPL-SCNC: 24 MMOL/L (ref 21–32)
COMMENT, HOLDF: NORMAL
CREAT SERPL-MCNC: 1.14 MG/DL (ref 0.55–1.02)
DIAGNOSIS, 93000: NORMAL
DIFFERENTIAL METHOD BLD: NORMAL
EOSINOPHIL # BLD: 0.2 K/UL (ref 0–0.4)
EOSINOPHIL NFR BLD: 3 % (ref 0–7)
ERYTHROCYTE [DISTWIDTH] IN BLOOD BY AUTOMATED COUNT: 11.9 % (ref 11.5–14.5)
GLOBULIN SER CALC-MCNC: 4.3 G/DL (ref 2–4)
GLUCOSE SERPL-MCNC: 96 MG/DL (ref 65–100)
HCT VFR BLD AUTO: 42.8 % (ref 35–47)
HGB BLD-MCNC: 13.8 G/DL (ref 11.5–16)
IMM GRANULOCYTES # BLD: 0 K/UL (ref 0–0.04)
IMM GRANULOCYTES NFR BLD AUTO: 0 % (ref 0–0.5)
LIPASE SERPL-CCNC: 139 U/L (ref 73–393)
LYMPHOCYTES # BLD: 1.1 K/UL (ref 0.8–3.5)
LYMPHOCYTES NFR BLD: 22 % (ref 12–49)
MCH RBC QN AUTO: 29.6 PG (ref 26–34)
MCHC RBC AUTO-ENTMCNC: 32.2 G/DL (ref 30–36.5)
MCV RBC AUTO: 91.8 FL (ref 80–99)
MONOCYTES # BLD: 0.4 K/UL (ref 0–1)
MONOCYTES NFR BLD: 9 % (ref 5–13)
NEUTS SEG # BLD: 3.3 K/UL (ref 1.8–8)
NEUTS SEG NFR BLD: 65 % (ref 32–75)
NRBC # BLD: 0 K/UL (ref 0–0.01)
NRBC BLD-RTO: 0 PER 100 WBC
P-R INTERVAL, ECG05: 164 MS
PLATELET # BLD AUTO: 318 K/UL (ref 150–400)
PMV BLD AUTO: 10.3 FL (ref 8.9–12.9)
POTASSIUM SERPL-SCNC: 4 MMOL/L (ref 3.5–5.1)
PROT SERPL-MCNC: 8.2 G/DL (ref 6.4–8.2)
Q-T INTERVAL, ECG07: 384 MS
QRS DURATION, ECG06: 72 MS
QTC CALCULATION (BEZET), ECG08: 442 MS
RBC # BLD AUTO: 4.66 M/UL (ref 3.8–5.2)
SAMPLES BEING HELD,HOLD: NORMAL
SODIUM SERPL-SCNC: 139 MMOL/L (ref 136–145)
TROPONIN I SERPL-MCNC: <0.05 NG/ML
VENTRICULAR RATE, ECG03: 80 BPM
WBC # BLD AUTO: 5.1 K/UL (ref 3.6–11)

## 2019-01-02 PROCEDURE — 84484 ASSAY OF TROPONIN QUANT: CPT

## 2019-01-02 PROCEDURE — 71045 X-RAY EXAM CHEST 1 VIEW: CPT

## 2019-01-02 PROCEDURE — 36415 COLL VENOUS BLD VENIPUNCTURE: CPT

## 2019-01-02 PROCEDURE — 80053 COMPREHEN METABOLIC PANEL: CPT

## 2019-01-02 PROCEDURE — 85025 COMPLETE CBC W/AUTO DIFF WBC: CPT

## 2019-01-02 PROCEDURE — 93005 ELECTROCARDIOGRAM TRACING: CPT

## 2019-01-02 PROCEDURE — 74011250637 HC RX REV CODE- 250/637: Performed by: STUDENT IN AN ORGANIZED HEALTH CARE EDUCATION/TRAINING PROGRAM

## 2019-01-02 PROCEDURE — 74011000250 HC RX REV CODE- 250: Performed by: STUDENT IN AN ORGANIZED HEALTH CARE EDUCATION/TRAINING PROGRAM

## 2019-01-02 PROCEDURE — 99285 EMERGENCY DEPT VISIT HI MDM: CPT

## 2019-01-02 PROCEDURE — 83690 ASSAY OF LIPASE: CPT

## 2019-01-02 RX ADMIN — LIDOCAINE HYDROCHLORIDE 40 ML: 20 SOLUTION ORAL; TOPICAL at 07:34

## 2019-01-02 NOTE — DISCHARGE INSTRUCTIONS

## 2019-01-02 NOTE — ED TRIAGE NOTES
Pt arrives ambulatory through triage c/o chest tightness since she woke up yesterday around 0600. Denies, SOB, N/V, Hx of clots Pt is blind in both eyes,  who is also blind is at bedside

## 2019-01-02 NOTE — ED NOTES
Bedside and Verbal shift change report given to Desiree Ojeda RN (oncoming nurse) by Nelli Siddiqui RN (offgoing nurse). Report included the following information SBAR, ED Summary, Intake/Output and MAR.

## 2019-01-02 NOTE — ED PROVIDER NOTES
51 y.o. female with past medical history significant for hypothyroidism, blindness, s/o cholecystectomy who presents from home via private vehicle with chief complaint of chest pain. Patient reports 25 hours of midsternal chest pain described as \"tightness or discomfort\". Patient reports a h/o similar pains, but they have not persisted this long in the past. Patient also notes last night her right shoulder started to hurt. Patient states currently, her sx are somewhat better. Patient states yesterday, the pain was not changed by exertion, but states an increase in pain after dinner. Patient reports checking her BP at home and noted it has been elevated. Patient denies h/o HTN. Patient denies any personal h/o CAD or FMHx of early MI. Pt denies any recent surgery or travel. Pt denies any injuries or trauma. Pt denies any h/o blood clots. Pt denies any estrogen use. Pt denies any leg pain, leg swelling, fever, cough, SOB, vomiting, diaphoresis, or abdominal pain. There are no other acute medical concerns at this time. Social hx: Nonsmoker; Rare EtOH use PCP: Coleman Fuller DO Note written by Marry Limon, as dictated by Melissa Porras MD 6:52 AM 
 
 
The history is provided by the patient. No  was used. Past Medical History:  
Diagnosis Date  Ankle fracture 1976 Left       due to fall  Blindness - both eyes 1977  
 due to Optic Neuritis.  Chickenpox childhood  Fracture of elbow 1976 Left        due to fall  Heartburn 2000 Dr. Judd Sousa  History of gallstones 07/2007 Dr. Hayde Gilliland  Hypothyroidism 01/2006 Hashimoto's.  Pure hypercholesterolemia 10/29/07 [address]. Marianela Ortega in [place]  Vitamin D deficiency 05/2012 Past Surgical History:  
Procedure Laterality Date [address]  
 to correct scoliosis. Dr. Marianela Ortega in [place] [address] Family History: Adopted:  Yes  
 Problem Relation Age of Onset  Cancer Mother   
     skin  Thyroid Disease Mother  Hypertension Mother  Arthritis-osteo Father Social History Socioeconomic History  Marital status:  Spouse name: Not on file  Number of children: Not on file  Years of education: Not on file  Highest education level: Not on file Social Needs  Financial resource strain: Not on file  Food insecurity - worry: Not on file  Food insecurity - inability: Not on file  Transportation needs - medical: Not on file  Transportation needs - non-medical: Not on file Occupational History  Not on file Tobacco Use  Smoking status: Never Smoker  Smokeless tobacco: Never Used Substance and Sexual Activity  Alcohol use: Yes Alcohol/week: 0.0 oz  
  Comment: yearly  Drug use: No  
 Sexual activity: Yes  
  Partners: Male Birth control/protection: None Other Topics Concern  Not on file Social History Narrative  Not on file ALLERGIES: Zantac [ranitidine hcl] Review of Systems Constitutional: Negative for chills, diaphoresis and fever. HENT: Negative for sore throat. Respiratory: Negative for cough and shortness of breath. Cardiovascular: Positive for chest pain. Negative for leg swelling. Gastrointestinal: Negative for abdominal pain and vomiting. Genitourinary: Negative for dysuria. Musculoskeletal: Negative for back pain and myalgias. Skin: Negative for rash. Neurological: Negative for syncope and headaches. Psychiatric/Behavioral: Negative for confusion. All other systems reviewed and are negative. There were no vitals filed for this visit. Physical Exam  
Constitutional: She is oriented to person, place, and time. She appears well-developed. No distress. HENT:  
Head: Normocephalic and atraumatic. Eyes: Conjunctivae and EOM are normal.  
Neck: Normal range of motion. Neck supple. Cardiovascular: Normal rate, regular rhythm and normal heart sounds. Pulmonary/Chest: Effort normal and breath sounds normal. No respiratory distress. She exhibits no tenderness and no crepitus. No chest wall tenderness or crepitus. Abdominal: Soft. There is no tenderness. There is no guarding. Musculoskeletal: Normal range of motion. She exhibits no edema. Right shoulder: She exhibits normal range of motion, no tenderness, no effusion and no deformity. Right shoulder with no tenderness, no effusion, no warmth, or erythema. FROM. Neurological: She is alert and oriented to person, place, and time. She exhibits normal muscle tone. Skin: Skin is warm and dry. Nursing note and vitals reviewed. Note written by Marry Garcia, as dictated by J Luis Up MD 6:52 AM 
 
MDM Procedures ED EKG interpretation: 
Rhythm: normal sinus rhythm; and regular . Rate (approx.): 80 bpm; Normal axis; Normal intervals; No STEMI. Note written by Marry Garcia, as dictated by J Luis Up MD 6:54 AM 
  
9:08 AM 
The patient is resting comfortably and feels better, is alert and in no distress. The repeat examination is unremarkable and benign. The electrocardiogram shows no signs of acute ischemia and the history, exam, diagnostic testing and current condition do not suggest that this patient is having an acute myocardial infarction, significant arrhythmia, unstable angina (low HEART score), esophageal perforation, pulmonary embolism (PERC negative), aortic dissection, pneumothorax, severe pneumonia, sepsis or other significant pathology that would warrant further testing, continued ED treatment, admission, or cardiology or other specialist consultation at this point. The vital signs have been stable. The patient's condition is stable and appropriate for discharge.  The patient will pursue further outpatient evaluation with the primary care physician, other designated physician or cardiologist. The patient and/or caregivers have expressed a clear and thorough understanding and agree to follow up as instructed.

## 2019-01-02 NOTE — ED NOTES
Pt given discharge instructions and teaching by MD and RN. Pt verbalized understanding and ambulatory out of ED alongside .

## 2019-03-29 ENCOUNTER — HOSPITAL ENCOUNTER (OUTPATIENT)
Dept: MAMMOGRAPHY | Age: 49
Discharge: HOME | End: 2019-03-29
Attending: GENERAL PRACTICE
Payer: COMMERCIAL

## 2019-03-29 DIAGNOSIS — Z12.39 SCREENING BREAST EXAMINATION: ICD-10-CM

## 2019-03-29 PROCEDURE — 77063 BREAST TOMOSYNTHESIS BI: CPT

## 2020-03-10 ENCOUNTER — HOSPITAL ENCOUNTER (OUTPATIENT)
Dept: ULTRASOUND IMAGING | Age: 50
Discharge: HOME | End: 2020-03-10
Payer: COMMERCIAL

## 2020-03-10 DIAGNOSIS — E04.0 GOITER DIFFUSE, NONTOXIC: ICD-10-CM

## 2020-03-10 PROCEDURE — 76536 US EXAM OF HEAD AND NECK: CPT

## 2020-07-21 ENCOUNTER — HOSPITAL ENCOUNTER (OUTPATIENT)
Dept: MAMMOGRAPHY | Age: 50
Discharge: HOME | End: 2020-07-21
Attending: GENERAL PRACTICE
Payer: COMMERCIAL

## 2020-07-21 DIAGNOSIS — Z12.31 VISIT FOR SCREENING MAMMOGRAM: ICD-10-CM

## 2020-07-21 PROCEDURE — 77063 BREAST TOMOSYNTHESIS BI: CPT

## 2022-03-19 PROBLEM — H54.8 LEGAL BLINDNESS: Status: ACTIVE | Noted: 2017-10-16

## 2022-03-28 ENCOUNTER — TRANSCRIBE ORDER (OUTPATIENT)
Dept: SCHEDULING | Age: 52
End: 2022-03-28

## 2022-03-28 DIAGNOSIS — Z12.31 ENCOUNTER FOR SCREENING MAMMOGRAM FOR MALIGNANT NEOPLASM OF BREAST: Primary | ICD-10-CM

## 2022-03-28 DIAGNOSIS — R19.00 INTRA-ABDOMINAL AND PELVIC SWELLING, MASS AND LUMP, UNSPECIFIED SITE: Primary | ICD-10-CM

## 2022-04-04 ENCOUNTER — HOSPITAL ENCOUNTER (OUTPATIENT)
Dept: ULTRASOUND IMAGING | Age: 52
Discharge: HOME | End: 2022-04-04
Attending: FAMILY MEDICINE
Payer: COMMERCIAL

## 2022-04-04 ENCOUNTER — HOSPITAL ENCOUNTER (OUTPATIENT)
Dept: MAMMOGRAPHY | Age: 52
Discharge: HOME | End: 2022-04-04
Attending: FAMILY MEDICINE
Payer: COMMERCIAL

## 2022-04-04 DIAGNOSIS — R19.00 INTRA-ABDOMINAL AND PELVIC SWELLING, MASS AND LUMP, UNSPECIFIED SITE: ICD-10-CM

## 2022-04-04 DIAGNOSIS — Z12.31 ENCOUNTER FOR SCREENING MAMMOGRAM FOR MALIGNANT NEOPLASM OF BREAST: ICD-10-CM

## 2022-04-04 PROCEDURE — 76830 TRANSVAGINAL US NON-OB: CPT

## 2022-04-04 PROCEDURE — 76856 US EXAM PELVIC COMPLETE: CPT

## 2022-04-04 PROCEDURE — 77063 BREAST TOMOSYNTHESIS BI: CPT

## 2023-03-28 ENCOUNTER — TRANSCRIBE ORDER (OUTPATIENT)
Dept: SCHEDULING | Age: 53
End: 2023-03-28

## 2023-03-28 DIAGNOSIS — Z12.31 ENCOUNTER FOR SCREENING MAMMOGRAM FOR MALIGNANT NEOPLASM OF BREAST: Primary | ICD-10-CM

## 2023-04-23 DIAGNOSIS — Z12.31 ENCOUNTER FOR SCREENING MAMMOGRAM FOR MALIGNANT NEOPLASM OF BREAST: Primary | ICD-10-CM

## 2023-05-01 ENCOUNTER — TRANSCRIBE ORDER (OUTPATIENT)
Dept: SCHEDULING | Age: 53
End: 2023-05-01

## 2023-05-01 DIAGNOSIS — Z12.31 ENCOUNTER FOR SCREENING MAMMOGRAM FOR MALIGNANT NEOPLASM OF BREAST: Primary | ICD-10-CM

## 2025-02-05 ENCOUNTER — HOSPITAL ENCOUNTER (OUTPATIENT)
Facility: HOSPITAL | Age: 55
Discharge: HOME | End: 2025-02-08
Payer: COMMERCIAL

## 2025-02-05 VITALS — WEIGHT: 180 LBS | BODY MASS INDEX: 29.99 KG/M2 | HEIGHT: 65 IN

## 2025-02-05 DIAGNOSIS — Z12.31 ENCOUNTER FOR SCREENING MAMMOGRAM FOR MALIGNANT NEOPLASM OF BREAST: ICD-10-CM

## 2025-02-05 PROCEDURE — 77063 BREAST TOMOSYNTHESIS BI: CPT
